# Patient Record
(demographics unavailable — no encounter records)

---

## 2025-05-30 NOTE — LETTER BODY
[FreeTextEntry1] : Sam Wakefield MD  [id number]  [place] [address]  [phone]   Dear Dr Wakefield,   Reason for Visit: Dysuria   This is a 64 year-old gentleman with dysuria. Patient is referred for evaluation of his condition. Patient denies any gross hematuria or urinary incontinence. The patient denies any aggravating or relieving factors. The patient denies any interference of function. The patient is entirely asymptomatic. All other review of systems are negative. He has no cancer in his family medical history. He has no previous surgical history. Past medical history, family history and social history were inquired and were noncontributory to current condition. The patient does not use tobacco or drink alcohol. Medications and allergies were reviewed. He has no known allergies to medication.   On examination, the patient is a well-appearing gentleman in no acute distress. He is alert and  oriented follows commands. He has normal mood and affect. He is normocephalic. Oral no thrush. Neck is supple. Respirations are unlabored. His abdomen is soft and nontender. Liver is nonpalpable. Bladder is nonpalpable. No CVA tenderness. Neurologically he is grossly intact. No peripheral edema. Skin without gross abnormality.    Assessment: Dysuria   I counseled the patient. I discussed the various etiologies of his symptoms. I recommend the patient obtain BMP, PSA, and chlamydia/GC amplification.  I recommended the patient obtain urinalysis and urine culture to evaluate for infections and high grade urothelial carcinoma. I also recommend the patient begin a trial of Vitamin C.  Risks and alternatives were discussed. I answered the patient's questions. The patient will follow-up as directed and will contact me with any questions or concerns. Thank you for the opportunity to participate in the care of this patient. I'll keep you updated on his progress.    Plan: BMP. PSA. Chlamydia/GC Amplification. Urine Culture. Urinalysis. Trial of Vitamin C.  Follow up in 1 month.

## 2025-05-30 NOTE — ADDENDUM
[FreeTextEntry1] :  Entered by Shanita Perez, acting as scribe for Dr Puma Snell. The documentation recorded by the scribe accurately reflects the service I personally performed and the decisions made by me.

## 2025-05-30 NOTE — LETTER BODY
[FreeTextEntry1] : Sam Wakefield MD  [address]  [place] [address]  [phone]   Dear Dr Wakefield,   Reason for Visit: Dysuria   This is a 64 year-old gentleman with dysuria. Patient is referred for evaluation of his condition. Patient denies any gross hematuria or urinary incontinence. The patient denies any aggravating or relieving factors. The patient denies any interference of function. The patient is entirely asymptomatic. All other review of systems are negative. He has no cancer in his family medical history. He has no previous surgical history. Past medical history, family history and social history were inquired and were noncontributory to current condition. The patient does not use tobacco or drink alcohol. Medications and allergies were reviewed. He has no known allergies to medication.   On examination, the patient is a well-appearing gentleman in no acute distress. He is alert and  oriented follows commands. He has normal mood and affect. He is normocephalic. Oral no thrush. Neck is supple. Respirations are unlabored. His abdomen is soft and nontender. Liver is nonpalpable. Bladder is nonpalpable. No CVA tenderness. Neurologically he is grossly intact. No peripheral edema. Skin without gross abnormality.    Assessment: Dysuria   I counseled the patient. I discussed the various etiologies of his symptoms. I recommend the patient obtain BMP, PSA, and chlamydia/GC amplification.  I recommended the patient obtain urinalysis and urine culture to evaluate for infections and high grade urothelial carcinoma. I also recommend the patient begin a trial of Vitamin C.  Risks and alternatives were discussed. I answered the patient's questions. The patient will follow-up as directed and will contact me with any questions or concerns. Thank you for the opportunity to participate in the care of this patient. I'll keep you updated on his progress.    Plan: BMP. PSA. Chlamydia/GC Amplification. Urine Culture. Urinalysis. Trial of Vitamin C.  Follow up in 1 month.

## 2025-07-08 NOTE — LETTER BODY
[FreeTextEntry1] : Sam Wakefield MD [address] [facility] [address] [place] [address] [phone]  Dear Dr Wakefield,  Reason for Visit: Dysuria. Recurrent UTI.    This is a 64 year-old gentleman with dysuria and recurrent UTI. The patient returns for follow up today. He initially reported dysuria, foul smelling and cloudy urine. His urine culture in May 2025 was positive for E coli and he was treated with Keflex. Since he was last seen, he has completed this round of Keflex. At the time of his last visit, the plan was for him to begin taking Vitamin C regularly. He reports taking Vitamin C regularly without any side effects or difficulties. He reports that his dysuria has gotten better since treatment, and he now only has pain with urination when he holds it for too long. He does however report persistent cloudy and foul-smelling urine despite Keflex and Vitamin C. The patient reports one episode of red colored urine about 3 weeks ago that was not associated with any urological symptoms at the time. His PSA was 0.64 in May 2024.  All other review of systems are negative. He has no cancer in his family medical history. He has no previous surgical history. Past medical history, family history and social history were inquired and were noncontributory to current condition. The patient does not use tobacco or drink alcohol. Medications and allergies were reviewed. He has no known allergies to medication.   On examination, the patient is a well-appearing gentleman in no acute distress. He is alert and oriented follows commands. He has normal mood and affect. He is normocephalic. Oral no thrush. Neck is supple. Respirations are unlabored. His abdomen is soft and nontender. Liver is nonpalpable. Bladder is nonpalpable. No CVA tenderness. Neurologically he is grossly intact. No peripheral edema. Skin without gross abnormality.   Assessment: Dysuria. Recurrent UTI.   I counseled the patient. He reports persistent symptoms despite medical therapy. I recommended that the patient begin a trial of Flomax. I discussed the potential side effects of the medication. I counseled the patient on its use and side effects. If the patient develops any side effects, the patient will discontinue the medication and contact me.  I recommended the patient obtain urinalysis, urine culture, and urine cytology to evaluate for infections and high grade urothelial carcinoma. Risks and alternatives were discussed. I answered the patient's questions. The patient will follow-up as directed and will contact me with any questions or concerns. Thank you for the opportunity to participate in the care of this patient. I'll keep you updated on his progress.    Plan: Begin trial of Flomax. Urinalysis. Urine culture. Urine cytology. Follow up in 2 months.

## 2025-07-08 NOTE — LETTER BODY
[FreeTextEntry1] : Sam Wakefield MD [address] [place] [address] [phone]  Dear Dr Wakefield,  Reason for Visit: Dysuria. Recurrent UTI.    This is a 64 year-old gentleman with dysuria and recurrent UTI. The patient returns for follow up today. He initially reported dysuria, foul smelling and cloudy urine. His urine culture in May 2025 was positive for E coli and he was treated with Keflex. Since he was last seen, he has completed this round of Keflex. At the time of his last visit, the plan was for him to begin taking Vitamin C regularly. He reports taking Vitamin C regularly without any side effects or difficulties. He reports that his dysuria has gotten better since treatment, and he now only has pain with urination when he holds it for too long. He does however report persistent cloudy and foul-smelling urine despite Keflex and Vitamin C. The patient reports one episode of red colored urine about 3 weeks ago that was not associated with any urological symptoms at the time. His PSA was 0.64 in May 2024.  All other review of systems are negative. He has no cancer in his family medical history. He has no previous surgical history. Past medical history, family history and social history were inquired and were noncontributory to current condition. The patient does not use tobacco or drink alcohol. Medications and allergies were reviewed. He has no known allergies to medication.   On examination, the patient is a well-appearing gentleman in no acute distress. He is alert and oriented follows commands. He has normal mood and affect. He is normocephalic. Oral no thrush. Neck is supple. Respirations are unlabored. His abdomen is soft and nontender. Liver is nonpalpable. Bladder is nonpalpable. No CVA tenderness. Neurologically he is grossly intact. No peripheral edema. Skin without gross abnormality.   Assessment: Dysuria. Recurrent UTI.   I counseled the patient. He reports persistent symptoms despite medical therapy. I recommended that the patient begin a trial of Flomax. I discussed the potential side effects of the medication. I counseled the patient on its use and side effects. If the patient develops any side effects, the patient will discontinue the medication and contact me.  I recommended the patient obtain urinalysis, urine culture, and urine cytology to evaluate for infections and high grade urothelial carcinoma. Risks and alternatives were discussed. I answered the patient's questions. The patient will follow-up as directed and will contact me with any questions or concerns. Thank you for the opportunity to participate in the care of this patient. I'll keep you updated on his progress.    Plan: Begin trial of Flomax. Urinalysis. Urine culture. Urine cytology. Follow up in 2 months.

## 2025-07-08 NOTE — HISTORY OF PRESENT ILLNESS
[FreeTextEntry1] : F/U for dysuria, foul smell and cloudy urine, urine culture from May 2025 was positive for E.coli treated with Keflex. Patient reports dysuria getting better after treatment, now has pain with urination only when he hold it for too long. But persist cloudy and foul smell urine.  New one episode of red color urine about 3 weeks ago that not associated with any other urological symptom PSA 0.64 in May 2025  Please refer to URO Consult note